# Patient Record
Sex: FEMALE | Race: BLACK OR AFRICAN AMERICAN
[De-identification: names, ages, dates, MRNs, and addresses within clinical notes are randomized per-mention and may not be internally consistent; named-entity substitution may affect disease eponyms.]

---

## 2018-08-20 ENCOUNTER — HOSPITAL ENCOUNTER (OUTPATIENT)
Dept: HOSPITAL 35 - HYPER | Age: 68
End: 2018-08-20
Attending: PREVENTIVE MEDICINE
Payer: COMMERCIAL

## 2018-08-20 DIAGNOSIS — I10: ICD-10-CM

## 2018-08-20 DIAGNOSIS — M79.609: ICD-10-CM

## 2018-08-20 DIAGNOSIS — L97.811: ICD-10-CM

## 2018-08-20 DIAGNOSIS — M19.90: ICD-10-CM

## 2018-08-20 DIAGNOSIS — K21.9: ICD-10-CM

## 2018-08-20 DIAGNOSIS — I87.311: Primary | ICD-10-CM

## 2018-08-20 DIAGNOSIS — Z79.01: ICD-10-CM

## 2018-08-20 DIAGNOSIS — E78.5: ICD-10-CM

## 2018-08-20 DIAGNOSIS — Z86.718: ICD-10-CM

## 2018-08-20 DIAGNOSIS — M48.02: ICD-10-CM

## 2018-08-20 DIAGNOSIS — I73.9: ICD-10-CM

## 2018-08-30 ENCOUNTER — HOSPITAL ENCOUNTER (OUTPATIENT)
Dept: HOSPITAL 61 - PCVCIMAG | Age: 68
Discharge: HOME | End: 2018-08-30
Attending: PREVENTIVE MEDICINE
Payer: MEDICARE

## 2018-08-30 DIAGNOSIS — I73.9: Primary | ICD-10-CM

## 2018-08-30 PROCEDURE — 93925 LOWER EXTREMITY STUDY: CPT

## 2018-08-30 NOTE — PCVCIMAG
EXAM: BILATERAL LOWER EXTREMITY ARTERIAL DUPLEX



INDICATION: Peripheral Arterial Disease. Leg pain.  Right medial ankle

ulcer.



FINDINGS: 

Right Leg: Common femoral and profunda femoral arteries are patent. 

Superficial femoral artery and popliteal artery are patent.  The

anterior tibial and peroneal arteries are patent.  The posterior

tibial artery is occluded throughout its length.    



Left Leg:  Common femoral and profunda femoral arteries are patent. 

Superficial femoral artery and popliteal artery are patent.  The

anterior tibial and peroneal arteries are patent.  The posterior

tibial artery is occluded in its mid and distal portion.    



IMPRESSION: 

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  Otherwise no flow

limiting stenosis in the right lower extremity.

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  Otherwise

no flow limiting stenosis in the left lower extremity.

   



LOC:[id number]

## 2019-05-29 ENCOUNTER — HOSPITAL ENCOUNTER (OUTPATIENT)
Dept: HOSPITAL 35 - HYPER | Age: 69
End: 2019-05-29
Attending: PREVENTIVE MEDICINE
Payer: COMMERCIAL

## 2019-05-29 DIAGNOSIS — I87.311: Primary | ICD-10-CM

## 2019-05-29 DIAGNOSIS — Z86.718: ICD-10-CM

## 2019-05-29 DIAGNOSIS — M79.609: ICD-10-CM

## 2019-05-29 DIAGNOSIS — M85.80: ICD-10-CM

## 2019-05-29 DIAGNOSIS — E78.5: ICD-10-CM

## 2019-05-29 DIAGNOSIS — R60.0: ICD-10-CM

## 2019-05-29 DIAGNOSIS — L97.311: ICD-10-CM

## 2019-05-29 DIAGNOSIS — K21.9: ICD-10-CM

## 2019-05-29 DIAGNOSIS — M48.02: ICD-10-CM

## 2019-05-29 DIAGNOSIS — Z79.01: ICD-10-CM

## 2019-05-29 DIAGNOSIS — I10: ICD-10-CM

## 2019-06-05 ENCOUNTER — HOSPITAL ENCOUNTER (OUTPATIENT)
Dept: HOSPITAL 35 - HYPER | Age: 69
End: 2019-06-05
Attending: PREVENTIVE MEDICINE
Payer: COMMERCIAL

## 2019-06-05 DIAGNOSIS — M85.80: ICD-10-CM

## 2019-06-05 DIAGNOSIS — L97.311: ICD-10-CM

## 2019-06-05 DIAGNOSIS — M48.02: ICD-10-CM

## 2019-06-05 DIAGNOSIS — Z86.718: ICD-10-CM

## 2019-06-05 DIAGNOSIS — I73.9: ICD-10-CM

## 2019-06-05 DIAGNOSIS — K21.9: ICD-10-CM

## 2019-06-05 DIAGNOSIS — I87.311: Primary | ICD-10-CM

## 2019-06-05 DIAGNOSIS — E78.5: ICD-10-CM

## 2019-06-05 DIAGNOSIS — Z79.01: ICD-10-CM

## 2019-06-05 DIAGNOSIS — M19.90: ICD-10-CM

## 2019-06-05 DIAGNOSIS — M79.609: ICD-10-CM

## 2019-06-05 DIAGNOSIS — R60.0: ICD-10-CM

## 2019-06-11 ENCOUNTER — HOSPITAL ENCOUNTER (OUTPATIENT)
Dept: HOSPITAL 35 - HYPER | Age: 69
End: 2019-06-11
Attending: PREVENTIVE MEDICINE
Payer: COMMERCIAL

## 2019-06-11 DIAGNOSIS — I73.9: ICD-10-CM

## 2019-06-11 DIAGNOSIS — I10: ICD-10-CM

## 2019-06-11 DIAGNOSIS — M19.90: ICD-10-CM

## 2019-06-11 DIAGNOSIS — E78.5: ICD-10-CM

## 2019-06-11 DIAGNOSIS — L97.311: ICD-10-CM

## 2019-06-11 DIAGNOSIS — Z86.718: ICD-10-CM

## 2019-06-11 DIAGNOSIS — I87.311: Primary | ICD-10-CM

## 2019-06-11 DIAGNOSIS — Z79.01: ICD-10-CM

## 2019-06-11 DIAGNOSIS — M79.609: ICD-10-CM

## 2019-06-11 DIAGNOSIS — K21.9: ICD-10-CM

## 2019-06-11 DIAGNOSIS — R60.0: ICD-10-CM

## 2019-06-11 DIAGNOSIS — M48.02: ICD-10-CM

## 2019-06-25 ENCOUNTER — HOSPITAL ENCOUNTER (OUTPATIENT)
Dept: HOSPITAL 35 - HYPER | Age: 69
End: 2019-06-25
Attending: PREVENTIVE MEDICINE
Payer: COMMERCIAL

## 2019-06-25 DIAGNOSIS — I73.9: ICD-10-CM

## 2019-06-25 DIAGNOSIS — Z79.01: ICD-10-CM

## 2019-06-25 DIAGNOSIS — K21.9: ICD-10-CM

## 2019-06-25 DIAGNOSIS — M85.80: ICD-10-CM

## 2019-06-25 DIAGNOSIS — R60.0: ICD-10-CM

## 2019-06-25 DIAGNOSIS — L97.311: ICD-10-CM

## 2019-06-25 DIAGNOSIS — M19.90: ICD-10-CM

## 2019-06-25 DIAGNOSIS — Z86.718: ICD-10-CM

## 2019-06-25 DIAGNOSIS — I87.311: Primary | ICD-10-CM

## 2019-06-25 DIAGNOSIS — E78.5: ICD-10-CM

## 2019-06-25 DIAGNOSIS — M48.02: ICD-10-CM

## 2019-06-25 DIAGNOSIS — M79.609: ICD-10-CM

## 2021-12-28 ENCOUNTER — HOSPITAL ENCOUNTER (OUTPATIENT)
Dept: HOSPITAL 35 - SJCVC | Age: 71
End: 2021-12-28
Attending: INTERNAL MEDICINE
Payer: COMMERCIAL

## 2021-12-28 DIAGNOSIS — R94.31: Primary | ICD-10-CM

## 2021-12-28 DIAGNOSIS — E78.5: ICD-10-CM

## 2021-12-28 DIAGNOSIS — Z79.899: ICD-10-CM

## 2021-12-28 DIAGNOSIS — I10: ICD-10-CM

## 2021-12-28 DIAGNOSIS — Z88.8: ICD-10-CM

## 2021-12-28 DIAGNOSIS — R00.1: ICD-10-CM

## 2021-12-28 DIAGNOSIS — Z79.01: ICD-10-CM

## 2021-12-28 DIAGNOSIS — Z13.220: ICD-10-CM

## 2021-12-28 DIAGNOSIS — K21.9: ICD-10-CM

## 2022-01-04 ENCOUNTER — HOSPITAL ENCOUNTER (OUTPATIENT)
Dept: HOSPITAL 35 - SJCVCIMAG | Age: 72
End: 2022-01-04
Attending: INTERNAL MEDICINE
Payer: COMMERCIAL

## 2022-01-04 DIAGNOSIS — I10: ICD-10-CM

## 2022-01-04 DIAGNOSIS — I08.1: Primary | ICD-10-CM

## 2022-01-04 DIAGNOSIS — Z79.01: ICD-10-CM

## 2022-01-04 DIAGNOSIS — K21.9: ICD-10-CM

## 2022-01-04 DIAGNOSIS — E78.5: ICD-10-CM

## 2022-01-04 DIAGNOSIS — Z88.8: ICD-10-CM

## 2022-01-04 DIAGNOSIS — E78.00: ICD-10-CM

## 2022-01-04 DIAGNOSIS — Z82.49: ICD-10-CM

## 2022-01-04 DIAGNOSIS — R07.2: ICD-10-CM

## 2022-01-04 DIAGNOSIS — Z79.899: ICD-10-CM
